# Patient Record
Sex: MALE | Race: WHITE | NOT HISPANIC OR LATINO | Employment: OTHER | URBAN - METROPOLITAN AREA
[De-identification: names, ages, dates, MRNs, and addresses within clinical notes are randomized per-mention and may not be internally consistent; named-entity substitution may affect disease eponyms.]

---

## 2017-01-13 ENCOUNTER — OFFICE VISIT (OUTPATIENT)
Dept: WOUND CARE | Facility: CLINIC | Age: 67
End: 2017-01-13
Payer: COMMERCIAL

## 2017-01-13 VITALS
WEIGHT: 315 LBS | TEMPERATURE: 99 F | HEIGHT: 72 IN | HEART RATE: 68 BPM | DIASTOLIC BLOOD PRESSURE: 71 MMHG | BODY MASS INDEX: 42.66 KG/M2 | SYSTOLIC BLOOD PRESSURE: 141 MMHG

## 2017-01-13 DIAGNOSIS — R60.0 BILATERAL LEG EDEMA: ICD-10-CM

## 2017-01-13 DIAGNOSIS — E11.8 TYPE 2 DIABETES MELLITUS WITH COMPLICATION, WITHOUT LONG-TERM CURRENT USE OF INSULIN: Primary | ICD-10-CM

## 2017-01-13 DIAGNOSIS — L97.522 SKIN ULCER OF TOE OF LEFT FOOT WITH FAT LAYER EXPOSED: ICD-10-CM

## 2017-01-13 DIAGNOSIS — L97.512 CHRONIC ULCER OF RIGHT FOOT WITH FAT LAYER EXPOSED: ICD-10-CM

## 2017-01-13 DIAGNOSIS — L97.511 ULCER OF TOE, RIGHT, LIMITED TO BREAKDOWN OF SKIN: ICD-10-CM

## 2017-01-13 DIAGNOSIS — L30.9 DERMATITIS: ICD-10-CM

## 2017-01-13 PROCEDURE — 99212 OFFICE O/P EST SF 10 MIN: CPT | Mod: S$PBB,,, | Performed by: NURSE PRACTITIONER

## 2017-01-13 PROCEDURE — 99999 PR PBB SHADOW E&M-EST. PATIENT-LVL V: CPT | Mod: PBBFAC,,, | Performed by: NURSE PRACTITIONER

## 2017-01-13 PROCEDURE — 99215 OFFICE O/P EST HI 40 MIN: CPT | Mod: PBBFAC | Performed by: NURSE PRACTITIONER

## 2017-01-13 NOTE — PATIENT INSTRUCTIONS
"Medihoney gel to bilateral foot and toe wounds daily.  Cotton in between toes.  Cover wounds with gauze and secure with roll gauze.  Compression with two 4" ace wraps bilateral lower legs.  "

## 2017-01-13 NOTE — MR AVS SNAPSHOT
[facility]  [address]  [facility] [address]  Phone: [phone]                  Jose F Hall   2017 11:00 AM   Office Visit    Description:  Male : 1950   Provider:  Nikkie Morales NP   Department:  [facility]           Reason for Visit     Wound Check           Diagnoses this Visit        Comments    Type 2 diabetes mellitus with complication, without long-term current use of insulin    -  Primary     Ulcer of toe, right, limited to breakdown of skin         Skin ulcer of toe of left foot with fat layer exposed         Chronic ulcer of right foot with fat layer exposed         Dermatitis         Bilateral leg edema                To Do List           Future Appointments        Provider Department Dept Phone    2017 2:00 PM [facility] [phone]      Goals (5 Years of Data)     None      Follow-Up and Disposition     Return in about 2 weeks (around 2017) for Wound evaluation.      [facility]     [facility] On Call Nurse Care Line -  Assistance  Registered nurses in the [facility] Center provide clinical advisement, health education, appointment booking, and other advisory services.  Call for this free service at [phone].             Medications           Message regarding Medications     Verify the changes and/or additions to your medication regime listed below are the same as discussed with your clinician today.  If any of these changes or additions are incorrect, please notify your healthcare provider.             Verify that the below list of medications is an accurate representation of the medications you are currently taking.  If none reported, the list may be blank. If incorrect, please contact your healthcare provider. Carry this list with you in case of emergency.           Current Medications     acetaminophen (TYLENOL) 325 MG tablet Take 325 mg by mouth 2 (two) times daily.     amitriptyline (ELAVIL) 75 MG tablet Take 100 mg by mouth every evening.     aspirin 81 MG Chew Take 81 mg by mouth once daily.    atenolol (TENORMIN) 100 MG tablet Take 100 mg by mouth once daily.    atorvastatin (LIPITOR) 80 MG tablet Take 40 mg by mouth every evening.     collagenase (SANTYL) ointment Apply topically once daily. Apply to wound daily as directed.    cyanocobalamin (VITAMIN B-12) 1000 MCG tablet Take 100 mcg by mouth 2 (two) times daily.     docusate sodium (COLACE) 50 MG capsule Take by mouth 2 (two) times daily.    fish oil-omega-3 fatty acids 300-1,000 mg capsule Take 2 g by mouth 3 (three) times daily.     gemfibrozil (LOPID) 600 MG tablet Take 600 mg by mouth 2 (two) times daily before meals.    insulin aspart (NOVOLOG) 100 unit/mL injection Inject 40 Units into the skin 3 (three) times daily before meals.     insulin glargine (LANTUS) 100 unit/mL injection Inject 40 Units into the skin every morning.     insulin glargine (LANTUS) 100 unit/mL injection Inject 60 Units into the skin every evening.    isosorbide dinitrate (ISORDIL) 40 MG Tab Take 20 mg by mouth once daily.     meloxicam (MOBIC) 7.5 MG tablet Take 7.5 mg by mouth 2 (two) times daily.    metformin (GLUCOPHAGE) 1000 MG tablet Take 1,000 mg by mouth 2 (two) times daily with meals.    methocarbamol (ROBAXIN) 750 MG Tab Take 750 mg by mouth 2 (two) times daily.    nitroGLYCERIN (NITROSTAT) 0.4 MG SL tablet Place 0.4 mg under the tongue every 5 (five) minutes as needed.    omeprazole (PRILOSEC) 20 MG capsule Take 20 mg by mouth 2 (two) times daily.     pregabalin (LYRICA) 150 MG capsule Take 150 mg by mouth 2 (two) times daily.    valsartan (DIOVAN) 160 MG tablet Take 160 mg by mouth once daily.    warfarin (COUMADIN) 5 MG tablet Take 10 mg by mouth every evening.           Clinical Reference Information           Vital Signs - Last Recorded  Most recent update: 1/13/2017 10:59 AM by Sarah King LPN    BP Pulse Temp Ht Wt BMI     "(!) 141/71 68 98.5 °F (36.9 °C) (Oral) 6' (1.829 m) (!) 154.7 kg (341 lb) 46.25 kg/m2      Blood Pressure          Most Recent Value    BP  (!)  141/71      Allergies as of 1/13/2017     No Known Allergies      Immunizations Administered on Date of Encounter - 1/13/2017     None      Instructions    Medihoney gel to bilateral foot and toe wounds daily.  Cotton in between toes.  Cover wounds with gauze and secure with roll gauze.  Compression with two 4" ace wraps bilateral lower legs.       "

## 2017-01-13 NOTE — PROGRESS NOTES
Subjective:       Patient ID: Jose F Hall is a 66 y.o. male.    Chief Complaint: Wound Check    Wound Check        This patient is seen today for reevaluation of wounds to both lower legs. They have been present since May 2015.  He was seen by a podiatrist at [place] and has used santyl, bacitracin and aquacel but it was not healing.  He is currently using wound medihoney gel on the heel and toe ulcers daily.  They are healing as evidenced by wound contracture. He is afebrile.  He denies increased redness, swelling or purulent drainage.  He does not complain of pain.  Review of Systems   Constitutional: Negative for chills, diaphoresis and fever.   HENT: Negative for hearing loss, postnasal drip, rhinorrhea, sinus pressure, sneezing, sore throat, tinnitus and trouble swallowing.    Eyes: Negative for visual disturbance.   Respiratory: Positive for apnea (on CPAP) and shortness of breath. Negative for cough and wheezing.    Cardiovascular: Positive for leg swelling. Negative for chest pain and palpitations.   Gastrointestinal: Negative for constipation, diarrhea, nausea and vomiting.   Endocrine: Negative for cold intolerance, heat intolerance, polydipsia and polyuria.   Genitourinary: Negative for difficulty urinating, dysuria, frequency and hematuria.   Musculoskeletal: Negative for arthralgias, back pain and joint swelling.   Skin: Positive for wound. Negative for color change.   Neurological: Negative for dizziness, weakness, light-headedness and headaches.   Hematological: Does not bruise/bleed easily.   Psychiatric/Behavioral: Negative for confusion, decreased concentration, dysphoric mood and sleep disturbance. The patient is not nervous/anxious.        Objective:      Physical Exam   Constitutional: He is oriented to person, place, and time. He appears well-developed and well-nourished. No distress.   HENT:   Head: Normocephalic and atraumatic.   Neck: Normal range of motion.  "  Pulmonary/Chest: Effort normal.   Musculoskeletal: Normal range of motion. He exhibits no edema or tenderness.        Feet:    Neurological: He is alert and oriented to person, place, and time.   Skin: Skin is warm and dry. No rash noted. He is not diaphoretic. No erythema. No pallor.   Psychiatric: He has a normal mood and affect. His behavior is normal. Judgment and thought content normal.   Nursing note and vitals reviewed.        Assessment:       1. Type 2 diabetes mellitus with complication, without long-term current use of insulin    2. Ulcer of toe, right, limited to breakdown of skin    3. Skin ulcer of toe of left foot with fat layer exposed    4. Chronic ulcer of right foot with fat layer exposed    5. Dermatitis    6. Bilateral leg edema        Plan:           Eucerin cream to lower legs twice daily.  Mepilex foam and toeball dressing left great toe.  Medihoney gel to bilateral foot and toe wounds daily.  Cotton in between toes.  Cover wounds with gauze and secure with roll gauze.  Compression with two 4" ace wraps bilateral lower legs.  Return to clinic in 2 weeks.    Right lateral foot ulcer      Left plantar great toe      Right dorsal second and third toe wounds      Left lateral fourth toe kissing ulcer                                              "

## 2017-01-27 ENCOUNTER — OFFICE VISIT (OUTPATIENT)
Dept: WOUND CARE | Facility: CLINIC | Age: 67
End: 2017-01-27
Payer: OTHER GOVERNMENT

## 2017-01-27 VITALS
HEIGHT: 72 IN | HEART RATE: 65 BPM | WEIGHT: 315 LBS | TEMPERATURE: 98 F | SYSTOLIC BLOOD PRESSURE: 125 MMHG | BODY MASS INDEX: 42.66 KG/M2 | DIASTOLIC BLOOD PRESSURE: 67 MMHG

## 2017-01-27 DIAGNOSIS — L97.511 ULCER OF TOE, RIGHT, LIMITED TO BREAKDOWN OF SKIN: ICD-10-CM

## 2017-01-27 DIAGNOSIS — R60.0 BILATERAL LEG EDEMA: ICD-10-CM

## 2017-01-27 DIAGNOSIS — I87.2 VENOUS INSUFFICIENCY OF BOTH LOWER EXTREMITIES: ICD-10-CM

## 2017-01-27 DIAGNOSIS — L97.512 CHRONIC ULCER OF RIGHT FOOT WITH FAT LAYER EXPOSED: ICD-10-CM

## 2017-01-27 DIAGNOSIS — L30.9 DERMATITIS: ICD-10-CM

## 2017-01-27 DIAGNOSIS — E11.8 TYPE 2 DIABETES MELLITUS WITH COMPLICATION, WITHOUT LONG-TERM CURRENT USE OF INSULIN: Primary | ICD-10-CM

## 2017-01-27 PROCEDURE — 99212 OFFICE O/P EST SF 10 MIN: CPT | Mod: S$PBB,,, | Performed by: NURSE PRACTITIONER

## 2017-01-27 PROCEDURE — 99215 OFFICE O/P EST HI 40 MIN: CPT | Mod: PBBFAC | Performed by: NURSE PRACTITIONER

## 2017-01-27 PROCEDURE — 99999 PR PBB SHADOW E&M-EST. PATIENT-LVL V: CPT | Mod: PBBFAC,,, | Performed by: NURSE PRACTITIONER

## 2017-01-27 NOTE — MR AVS SNAPSHOT
[facility]  [address]  [facility] [address]  Phone: [phone]                  Jose F Hall   2017 2:00 PM   Office Visit    Description:  Male : 1950   Provider:  Nikkie Morales NP   Department:  [facility]           Reason for Visit     Wound Check           Diagnoses this Visit        Comments    Type 2 diabetes mellitus with complication, without long-term current use of insulin    -  Primary     Chronic ulcer of right foot with fat layer exposed         Ulcer of toe, right, limited to breakdown of skin         Venous insufficiency of both lower extremities         Bilateral leg edema         Dermatitis                To Do List           Future Appointments        Provider Department Dept Phone    2/10/2017 11:20 AM [facility] [phone]      Goals (5 Years of Data)     None      Follow-Up and Disposition     Return in about 2 weeks (around 2/10/2017) for Wound evaluation.      [facility]     [facility] On Call Nurse Care Line -  Assistance  Registered nurses in the [facility] Call Center provide clinical advisement, health education, appointment booking, and other advisory services.  Call for this free service at [phone].             Medications           Message regarding Medications     Verify the changes and/or additions to your medication regime listed below are the same as discussed with your clinician today.  If any of these changes or additions are incorrect, please notify your healthcare provider.             Verify that the below list of medications is an accurate representation of the medications you are currently taking.  If none reported, the list may be blank. If incorrect, please contact your healthcare provider. Carry this list with you in case of emergency.           Current Medications     acetaminophen (TYLENOL) 325 MG tablet Take 325 mg by mouth 2 (two) times daily.     amitriptyline (ELAVIL) 75 MG tablet Take 100 mg by mouth every evening.     aspirin 81 MG Chew Take 81 mg by mouth once daily.    atenolol (TENORMIN) 100 MG tablet Take 100 mg by mouth once daily.    atorvastatin (LIPITOR) 80 MG tablet Take 40 mg by mouth every evening.     collagenase (SANTYL) ointment Apply topically once daily. Apply to wound daily as directed.    cyanocobalamin (VITAMIN B-12) 1000 MCG tablet Take 100 mcg by mouth 2 (two) times daily.     docusate sodium (COLACE) 50 MG capsule Take by mouth 2 (two) times daily.    fish oil-omega-3 fatty acids 300-1,000 mg capsule Take 2 g by mouth 3 (three) times daily.     gemfibrozil (LOPID) 600 MG tablet Take 600 mg by mouth 2 (two) times daily before meals.    insulin aspart (NOVOLOG) 100 unit/mL injection Inject 40 Units into the skin 3 (three) times daily before meals.     insulin glargine (LANTUS) 100 unit/mL injection Inject 40 Units into the skin every morning.     insulin glargine (LANTUS) 100 unit/mL injection Inject 60 Units into the skin every evening.    isosorbide dinitrate (ISORDIL) 40 MG Tab Take 20 mg by mouth once daily.     meloxicam (MOBIC) 7.5 MG tablet Take 7.5 mg by mouth 2 (two) times daily.    metformin (GLUCOPHAGE) 1000 MG tablet Take 1,000 mg by mouth 2 (two) times daily with meals.    methocarbamol (ROBAXIN) 750 MG Tab Take 750 mg by mouth 2 (two) times daily.    nitroGLYCERIN (NITROSTAT) 0.4 MG SL tablet Place 0.4 mg under the tongue every 5 (five) minutes as needed.    omeprazole (PRILOSEC) 20 MG capsule Take 20 mg by mouth 2 (two) times daily.     pregabalin (LYRICA) 150 MG capsule Take 150 mg by mouth 2 (two) times daily.    valsartan (DIOVAN) 160 MG tablet Take 160 mg by mouth once daily.    warfarin (COUMADIN) 5 MG tablet Take 10 mg by mouth every evening.           Clinical Reference Information           Vital Signs - Last Recorded  Most recent update: 1/27/2017  1:58 PM by Sarah King LPN    BP Pulse Temp Ht Wt BMI     "125/67 65 98.2 °F (36.8 °C) (Oral) 6' (1.829 m) (!) 153.3 kg (338 lb) 45.84 kg/m2      Blood Pressure          Most Recent Value    BP  125/67      Allergies as of 1/27/2017     No Known Allergies      Immunizations Administered on Date of Encounter - 1/27/2017     None      Instructions    Eucerin cream to lower legs twice daily.  Medihoney gel to bilateral toe wounds daily.  Cotton in between toes.  Cover wounds with gauze and secure with roll gauze.  Compression with two 4" ace wraps bilateral lower legs.  May resume wearing compression hose as long as the toes are not covered.       "

## 2017-01-27 NOTE — PATIENT INSTRUCTIONS
"Eucerin cream to lower legs twice daily.  Medihoney gel to bilateral toe wounds daily.  Cotton in between toes.  Cover wounds with gauze and secure with roll gauze.  Compression with two 4" ace wraps bilateral lower legs.  May resume wearing compression hose as long as the toes are not covered.  "

## 2017-01-27 NOTE — PROGRESS NOTES
Subjective:       Patient ID: Jose F Hall is a 66 y.o. male.    Chief Complaint: Wound Check    Wound Check        This patient is seen today for reevaluation of wounds to both lower legs. They have been present since May 2015.  He was seen by a podiatrist at [place] and has used santyl, bacitracin and aquacel but it was not healing.  He is currently using wound medihoney gel on the heel and toe ulcers daily.  They are healing as evidenced by wound contracture. He is afebrile.  He denies increased redness, swelling or purulent drainage.  He does not complain of pain.  Review of Systems   Constitutional: Negative for chills, diaphoresis and fever.   HENT: Negative for hearing loss, postnasal drip, rhinorrhea, sinus pressure, sneezing, sore throat, tinnitus and trouble swallowing.    Eyes: Negative for visual disturbance.   Respiratory: Positive for apnea (on CPAP) and shortness of breath. Negative for cough and wheezing.    Cardiovascular: Positive for leg swelling. Negative for chest pain and palpitations.   Gastrointestinal: Negative for constipation, diarrhea, nausea and vomiting.   Endocrine: Negative for cold intolerance, heat intolerance, polydipsia and polyuria.   Genitourinary: Negative for difficulty urinating, dysuria, frequency and hematuria.   Musculoskeletal: Negative for arthralgias, back pain and joint swelling.   Skin: Positive for wound. Negative for color change.   Neurological: Negative for dizziness, weakness, light-headedness and headaches.   Hematological: Does not bruise/bleed easily.   Psychiatric/Behavioral: Negative for confusion, decreased concentration, dysphoric mood and sleep disturbance. The patient is not nervous/anxious.        Objective:      Physical Exam   Constitutional: He is oriented to person, place, and time. He appears well-developed and well-nourished. No distress.   HENT:   Head: Normocephalic and atraumatic.   Neck: Normal range of motion.  "  Pulmonary/Chest: Effort normal.   Musculoskeletal: Normal range of motion. He exhibits no edema or tenderness.        Feet:    Neurological: He is alert and oriented to person, place, and time.   Skin: Skin is warm and dry. No rash noted. He is not diaphoretic. No erythema. No pallor.   Psychiatric: He has a normal mood and affect. His behavior is normal. Judgment and thought content normal.   Nursing note and vitals reviewed.        Assessment:       1. Type 2 diabetes mellitus with complication, without long-term current use of insulin    2. Chronic ulcer of right foot with fat layer exposed    3. Ulcer of toe, right, limited to breakdown of skin    4. Venous insufficiency of both lower extremities    5. Bilateral leg edema    6. Dermatitis        Plan:           Eucerin cream to lower legs twice daily.  Medihoney gel to bilateral toe wounds daily.  Cotton in between toes.  Cover wounds with gauze and secure with roll gauze.  Compression with two 4" ace wraps bilateral lower legs.  May resume wearing compression hose.  Return to clinic in 2 weeks.    Right lateral foot ulcer healed      Left plantar great toe      Right dorsal second and third toe wounds      Left lateral fourth toe kissing ulcer                                                  "

## 2017-02-24 ENCOUNTER — OFFICE VISIT (OUTPATIENT)
Dept: WOUND CARE | Facility: CLINIC | Age: 67
End: 2017-02-24
Payer: COMMERCIAL

## 2017-02-24 VITALS
DIASTOLIC BLOOD PRESSURE: 74 MMHG | HEIGHT: 72 IN | TEMPERATURE: 98 F | SYSTOLIC BLOOD PRESSURE: 134 MMHG | HEART RATE: 66 BPM | WEIGHT: 315 LBS | BODY MASS INDEX: 42.66 KG/M2

## 2017-02-24 DIAGNOSIS — E11.8 TYPE 2 DIABETES MELLITUS WITH COMPLICATION, WITHOUT LONG-TERM CURRENT USE OF INSULIN: Primary | ICD-10-CM

## 2017-02-24 DIAGNOSIS — L97.522 SKIN ULCER OF TOE OF LEFT FOOT WITH FAT LAYER EXPOSED: ICD-10-CM

## 2017-02-24 DIAGNOSIS — L97.511 ULCER OF TOE, RIGHT, LIMITED TO BREAKDOWN OF SKIN: ICD-10-CM

## 2017-02-24 DIAGNOSIS — R60.0 BILATERAL LEG EDEMA: ICD-10-CM

## 2017-02-24 DIAGNOSIS — L30.9 DERMATITIS: ICD-10-CM

## 2017-02-24 PROCEDURE — 99999 PR PBB SHADOW E&M-EST. PATIENT-LVL V: CPT | Mod: PBBFAC,,, | Performed by: NURSE PRACTITIONER

## 2017-02-24 PROCEDURE — 99215 OFFICE O/P EST HI 40 MIN: CPT | Mod: PBBFAC | Performed by: NURSE PRACTITIONER

## 2017-02-24 PROCEDURE — 99212 OFFICE O/P EST SF 10 MIN: CPT | Mod: S$PBB,,, | Performed by: NURSE PRACTITIONER

## 2017-02-24 NOTE — PROGRESS NOTES
Subjective:       Patient ID: Jose F Hall is a 67 y.o. male.    Chief Complaint: Wound Check    Wound Check        This patient is seen today for reevaluation of wounds to both lower legs. They have been present since May 2015.  He was seen by a podiatrist at [place] and has used santyl, bacitracin and aquacel but it was not healing.  He is currently using wound medihoney gel on the toe ulcers daily.  The left toe ulcers are healed and the right toe ulcers are healing as evidenced by wound contracture. He is afebrile.  He denies increased redness, swelling or purulent drainage.  He does not complain of pain.  Review of Systems   Constitutional: Negative for chills, diaphoresis and fever.   HENT: Negative for hearing loss, postnasal drip, rhinorrhea, sinus pressure, sneezing, sore throat, tinnitus and trouble swallowing.    Eyes: Negative for visual disturbance.   Respiratory: Positive for apnea (on CPAP) and shortness of breath. Negative for cough and wheezing.    Cardiovascular: Positive for leg swelling. Negative for chest pain and palpitations.   Gastrointestinal: Negative for constipation, diarrhea, nausea and vomiting.   Endocrine: Negative for cold intolerance, heat intolerance, polydipsia and polyuria.   Genitourinary: Negative for difficulty urinating, dysuria, frequency and hematuria.   Musculoskeletal: Negative for arthralgias, back pain and joint swelling.   Skin: Positive for wound. Negative for color change.   Neurological: Negative for dizziness, weakness, light-headedness and headaches.   Hematological: Does not bruise/bleed easily.   Psychiatric/Behavioral: Negative for confusion, decreased concentration, dysphoric mood and sleep disturbance. The patient is not nervous/anxious.        Objective:      Physical Exam   Constitutional: He is oriented to person, place, and time. He appears well-developed and well-nourished. No distress.   HENT:   Head: Normocephalic and atraumatic.    Neck: Normal range of motion.   Pulmonary/Chest: Effort normal.   Musculoskeletal: Normal range of motion. He exhibits no edema or tenderness.        Feet:    Neurological: He is alert and oriented to person, place, and time.   Skin: Skin is warm and dry. No rash noted. He is not diaphoretic. No erythema. No pallor.   Psychiatric: He has a normal mood and affect. His behavior is normal. Judgment and thought content normal.   Nursing note and vitals reviewed.        Assessment:       1. Type 2 diabetes mellitus with complication, without long-term current use of insulin    2. Ulcer of toe, right, limited to breakdown of skin    3. Chronic ulcer of right foot with fat layer exposed    4. Skin ulcer of toe of left foot with fat layer exposed    5. Dermatitis    6. Bilateral leg edema        Plan:           Eucerin cream to lower legs twice daily.  Medihoney gel to right toe wounds daily.  Cotton in between toes.  Cover wounds with gauze and secure with roll gauze.  Compression hose bilateral lower legs.  Return to clinic in 2 weeks.    Left plantar great toe healed      Right dorsal second and third toe wounds      Left lateral fourth toe kissing ulcer healed

## 2017-02-24 NOTE — MR AVS SNAPSHOT
[facility]  [address]  [facility] [address]  Phone: [phone]                  Jose F Hall   2017 2:00 PM   Office Visit    Description:  Male : 1950   Provider:  Nikkie Morales NP   Department:  [facility]           Reason for Visit     Wound Check           Diagnoses this Visit        Comments    Type 2 diabetes mellitus with complication, without long-term current use of insulin    -  Primary     Ulcer of toe, right, limited to breakdown of skin         Skin ulcer of toe of left foot with fat layer exposed         Dermatitis         Bilateral leg edema                To Do List           Goals (5 Years of Data)     None      Follow-Up and Disposition     Return in about 2 weeks (around 3/10/2017) for Wound evaluation.      [facility] On Call     [facility] On Call Nurse Care Line -  Assistance  Registered nurses in the [facility] On Call Center provide clinical advisement, health education, appointment booking, and other advisory services.  Call for this free service at [phone].             Medications           Message regarding Medications     Verify the changes and/or additions to your medication regime listed below are the same as discussed with your clinician today.  If any of these changes or additions are incorrect, please notify your healthcare provider.             Verify that the below list of medications is an accurate representation of the medications you are currently taking.  If none reported, the list may be blank. If incorrect, please contact your healthcare provider. Carry this list with you in case of emergency.           Current Medications     acetaminophen (TYLENOL) 325 MG tablet Take 325 mg by mouth 2 (two) times daily.    amitriptyline (ELAVIL) 75 MG tablet Take 100 mg by mouth every evening.     aspirin 81 MG Chew Take 81 mg by mouth once daily.    atenolol (TENORMIN) 100 MG tablet Take 100 mg by mouth once daily.     atorvastatin (LIPITOR) 80 MG tablet Take 40 mg by mouth every evening.     collagenase (SANTYL) ointment Apply topically once daily. Apply to wound daily as directed.    cyanocobalamin (VITAMIN B-12) 1000 MCG tablet Take 100 mcg by mouth 2 (two) times daily.     docusate sodium (COLACE) 50 MG capsule Take by mouth 2 (two) times daily.    fish oil-omega-3 fatty acids 300-1,000 mg capsule Take 2 g by mouth 3 (three) times daily.     gemfibrozil (LOPID) 600 MG tablet Take 600 mg by mouth 2 (two) times daily before meals.    insulin aspart (NOVOLOG) 100 unit/mL injection Inject 40 Units into the skin 3 (three) times daily before meals.     insulin glargine (LANTUS) 100 unit/mL injection Inject 40 Units into the skin every morning.     insulin glargine (LANTUS) 100 unit/mL injection Inject 60 Units into the skin every evening.    isosorbide dinitrate (ISORDIL) 40 MG Tab Take 20 mg by mouth once daily.     meloxicam (MOBIC) 7.5 MG tablet Take 7.5 mg by mouth 2 (two) times daily.    metformin (GLUCOPHAGE) 1000 MG tablet Take 1,000 mg by mouth 2 (two) times daily with meals.    methocarbamol (ROBAXIN) 750 MG Tab Take 750 mg by mouth 2 (two) times daily.    nitroGLYCERIN (NITROSTAT) 0.4 MG SL tablet Place 0.4 mg under the tongue every 5 (five) minutes as needed.    omeprazole (PRILOSEC) 20 MG capsule Take 20 mg by mouth 2 (two) times daily.     pregabalin (LYRICA) 150 MG capsule Take 150 mg by mouth 2 (two) times daily.    valsartan (DIOVAN) 160 MG tablet Take 160 mg by mouth once daily.    warfarin (COUMADIN) 5 MG tablet Take 10 mg by mouth every evening.           Clinical Reference Information           Your Vitals Were     BP                   134/74           Blood Pressure          Most Recent Value    BP  134/74      Allergies as of 2/24/2017     No Known Allergies      Immunizations Administered on Date of Encounter - 2/24/2017     None      Instructions    Eucerin cream to lower legs twice daily.  Medihoney gel to  right toe wounds daily.  Cotton in between toes.  Cover wounds with gauze and secure with roll gauze.  Compression hose bilateral lower legs.       Language Assistance Services     ATTENTION: Language assistance services are available, free of charge. Please call [phone].      ATENCIÓN: Si habla pricilla, tiene a fields disposición servicios gratuitos de asistencia lingüística. Llame al [phone].     CHÚ Ý: N?u b?n nói Ti?ng Vi?t, có các d?ch v? h? tr? ngôn ng? mi?n phí dành cho b?n. G?i s? [phone].         [facility] complies with applicable Federal civil rights laws and does not discriminate on the basis of race, color, national origin, age, disability, or sex.

## 2017-02-24 NOTE — PATIENT INSTRUCTIONS
Eucerin cream to lower legs twice daily.  Medihoney gel to right toe wounds daily.  Cotton in between toes.  Cover wounds with gauze and secure with roll gauze.  Compression hose bilateral lower legs.

## 2017-03-10 ENCOUNTER — OFFICE VISIT (OUTPATIENT)
Dept: WOUND CARE | Facility: CLINIC | Age: 67
End: 2017-03-10
Payer: COMMERCIAL

## 2017-03-10 VITALS
BODY MASS INDEX: 40.43 KG/M2 | HEART RATE: 69 BPM | HEIGHT: 74 IN | TEMPERATURE: 98 F | SYSTOLIC BLOOD PRESSURE: 145 MMHG | DIASTOLIC BLOOD PRESSURE: 73 MMHG | WEIGHT: 315 LBS

## 2017-03-10 DIAGNOSIS — E11.8 TYPE 2 DIABETES MELLITUS WITH COMPLICATION, WITHOUT LONG-TERM CURRENT USE OF INSULIN: Primary | ICD-10-CM

## 2017-03-10 DIAGNOSIS — L97.522 SKIN ULCER OF TOE OF LEFT FOOT WITH FAT LAYER EXPOSED: ICD-10-CM

## 2017-03-10 DIAGNOSIS — R60.0 BILATERAL LEG EDEMA: ICD-10-CM

## 2017-03-10 DIAGNOSIS — L30.9 DERMATITIS: ICD-10-CM

## 2017-03-10 PROCEDURE — 99999 PR PBB SHADOW E&M-EST. PATIENT-LVL V: CPT | Mod: PBBFAC,,, | Performed by: NURSE PRACTITIONER

## 2017-03-10 PROCEDURE — 99215 OFFICE O/P EST HI 40 MIN: CPT | Mod: PBBFAC | Performed by: NURSE PRACTITIONER

## 2017-03-10 PROCEDURE — 99212 OFFICE O/P EST SF 10 MIN: CPT | Mod: S$PBB,,, | Performed by: NURSE PRACTITIONER

## 2017-03-10 NOTE — PATIENT INSTRUCTIONS
"Eucerin cream to lower legs twice daily.  Medihoney gel to foot and toe wounds daily.  Cotton in between toes.  Cover wounds with gauze and secure with roll gauze.  Compression hose left lower leg.  Kerlix and two 4" ace wraps right lower leg.    "

## 2017-03-10 NOTE — MR AVS SNAPSHOT
[facility]  [address]  [facility] [address]  Phone: [phone]                  Jose F Hall   3/10/2017 1:00 PM   Office Visit    Description:  Male : 1950   Provider:  Nikkie Morales NP   Department:  [facility]           Reason for Visit     Wound Check           Diagnoses this Visit        Comments    Type 2 diabetes mellitus with complication, without long-term current use of insulin    -  Primary     Skin ulcer of toe of left foot with fat layer exposed         Dermatitis         Bilateral leg edema                To Do List           Goals (5 Years of Data)     None      Follow-Up and Disposition     Return in about 2 weeks (around 3/24/2017) for Wound evaluation.      [facility]     [facility] On Call Nurse [facility] Line -  Assistance  Registered nurses in the [facility] On Call Center provide clinical advisement, health education, appointment booking, and other advisory services.  Call for this free service at [phone].             Medications           Message regarding Medications     Verify the changes and/or additions to your medication regime listed below are the same as discussed with your clinician today.  If any of these changes or additions are incorrect, please notify your healthcare provider.             Verify that the below list of medications is an accurate representation of the medications you are currently taking.  If none reported, the list may be blank. If incorrect, please contact your healthcare provider. Carry this list with you in case of emergency.           Current Medications     acetaminophen (TYLENOL) 325 MG tablet Take 325 mg by mouth 2 (two) times daily.    amitriptyline (ELAVIL) 75 MG tablet Take 100 mg by mouth every evening.     aspirin 81 MG Chew Take 81 mg by mouth once daily.    atenolol (TENORMIN) 100 MG tablet Take 100 mg by mouth once daily.    atorvastatin (LIPITOR) 80 MG tablet Take 40 mg by mouth  "every evening.     collagenase (SANTYL) ointment Apply topically once daily. Apply to wound daily as directed.    cyanocobalamin (VITAMIN B-12) 1000 MCG tablet Take 100 mcg by mouth 2 (two) times daily.     docusate sodium (COLACE) 50 MG capsule Take by mouth 2 (two) times daily.    fish oil-omega-3 fatty acids 300-1,000 mg capsule Take 2 g by mouth 3 (three) times daily.     gemfibrozil (LOPID) 600 MG tablet Take 600 mg by mouth 2 (two) times daily before meals.    insulin aspart (NOVOLOG) 100 unit/mL injection Inject 40 Units into the skin 3 (three) times daily before meals.     insulin glargine (LANTUS) 100 unit/mL injection Inject 40 Units into the skin every morning.     insulin glargine (LANTUS) 100 unit/mL injection Inject 60 Units into the skin every evening.    isosorbide dinitrate (ISORDIL) 40 MG Tab Take 20 mg by mouth once daily.     meloxicam (MOBIC) 7.5 MG tablet Take 7.5 mg by mouth 2 (two) times daily.    metformin (GLUCOPHAGE) 1000 MG tablet Take 1,000 mg by mouth 2 (two) times daily with meals.    methocarbamol (ROBAXIN) 750 MG Tab Take 750 mg by mouth 2 (two) times daily.    nitroGLYCERIN (NITROSTAT) 0.4 MG SL tablet Place 0.4 mg under the tongue every 5 (five) minutes as needed.    omeprazole (PRILOSEC) 20 MG capsule Take 20 mg by mouth 2 (two) times daily.     pregabalin (LYRICA) 150 MG capsule Take 150 mg by mouth 2 (two) times daily.    valsartan (DIOVAN) 160 MG tablet Take 160 mg by mouth once daily.    warfarin (COUMADIN) 5 MG tablet Take 10 mg by mouth every evening.           Clinical Reference Information           Allergies as of 3/10/2017     No Known Allergies      Immunizations Administered on Date of Encounter - 3/10/2017     None      Instructions    Eucerin cream to lower legs twice daily.  Medihoney gel to foot and toe wounds daily.  Cotton in between toes.  Cover wounds with gauze and secure with roll gauze.  Compression hose left lower leg.  Kerlix and two 4" ace wraps right " lower leg.         Language Assistance Services     ATTENTION: Language assistance services are available, free of charge. Please call [phone].      ATENCIÓN: Si habla pricilla, tiene a fields disposición servicios gratuitos de asistencia lingüística. Llame al [phone].     CHÚ Ý: N?u b?n nói Ti?ng Vi?t, có các d?ch v? h? tr? ngôn ng? mi?n phí dành cho b?n. G?i s? [phone].         [facility] complies with applicable Federal civil rights laws and does not discriminate on the basis of race, color, national origin, age, disability, or sex.

## 2017-03-10 NOTE — PROGRESS NOTES
Subjective:       Patient ID: Jose F Hall is a 67 y.o. male.    Chief Complaint: Wound Check    Wound Check        This patient is seen today for reevaluation of wounds to both lower legs. They have been present since May 2015.  He was seen by a podiatrist at [place] and has used santyl, bacitracin and aquacel but it was not healing.  He is currently using wound medihoney gel on the ulcers daily.  The left toe ulcer has recurred.  The right toe ulcers are healing as evidenced by wound contracture. However, he has a new ulcer to the plantar aspect of the right foot.  He believes this occurred because of a special shoe he was given to wear by a physician at the [facility].  He is afebrile.  He denies increased redness, swelling or purulent drainage.  He does not complain of pain.  Review of Systems   Constitutional: Negative for chills, diaphoresis and fever.   HENT: Negative for hearing loss, postnasal drip, rhinorrhea, sinus pressure, sneezing, sore throat, tinnitus and trouble swallowing.    Eyes: Negative for visual disturbance.   Respiratory: Positive for apnea (on CPAP) and shortness of breath. Negative for cough and wheezing.    Cardiovascular: Positive for leg swelling. Negative for chest pain and palpitations.   Gastrointestinal: Negative for constipation, diarrhea, nausea and vomiting.   Endocrine: Negative for cold intolerance, heat intolerance, polydipsia and polyuria.   Genitourinary: Negative for difficulty urinating, dysuria, frequency and hematuria.   Musculoskeletal: Negative for arthralgias, back pain and joint swelling.   Skin: Positive for wound. Negative for color change.   Neurological: Negative for dizziness, weakness, light-headedness and headaches.   Hematological: Does not bruise/bleed easily.   Psychiatric/Behavioral: Negative for confusion, decreased concentration, dysphoric mood and sleep disturbance. The patient is not nervous/anxious.        Objective:      Physical  "Exam   Constitutional: He is oriented to person, place, and time. He appears well-developed and well-nourished. No distress.   HENT:   Head: Normocephalic and atraumatic.   Neck: Normal range of motion.   Pulmonary/Chest: Effort normal.   Musculoskeletal: Normal range of motion. He exhibits no edema or tenderness.        Feet:    Neurological: He is alert and oriented to person, place, and time.   Skin: Skin is warm and dry. No rash noted. He is not diaphoretic. No erythema. No pallor.   Psychiatric: He has a normal mood and affect. His behavior is normal. Judgment and thought content normal.   Nursing note and vitals reviewed.        Assessment:       1. Type 2 diabetes mellitus with complication, without long-term current use of insulin    2. Skin ulcer of toe of left foot with fat layer exposed    3. Dermatitis    4. Bilateral leg edema        Plan:           Eucerin cream to lower legs twice daily.  Medihoney gel to foot and toe wounds daily.  Cotton in between toes.  Cover wounds with gauze and secure with roll gauze.  Compression hose left lower leg.  Kerlix and two 4" ace wraps right lower leg.  Return to clinic in 2 weeks.    Right dorsal second and third toe wounds      Left great toe      Right plantar foot                                                                "

## 2017-03-24 ENCOUNTER — OFFICE VISIT (OUTPATIENT)
Dept: WOUND CARE | Facility: CLINIC | Age: 67
End: 2017-03-24
Payer: OTHER GOVERNMENT

## 2017-03-24 VITALS
DIASTOLIC BLOOD PRESSURE: 68 MMHG | SYSTOLIC BLOOD PRESSURE: 117 MMHG | TEMPERATURE: 98 F | HEIGHT: 74 IN | HEART RATE: 69 BPM | BODY MASS INDEX: 40.43 KG/M2 | WEIGHT: 315 LBS

## 2017-03-24 DIAGNOSIS — L97.511 ULCER OF TOE, RIGHT, LIMITED TO BREAKDOWN OF SKIN: ICD-10-CM

## 2017-03-24 DIAGNOSIS — L97.522 SKIN ULCER OF TOE OF LEFT FOOT WITH FAT LAYER EXPOSED: ICD-10-CM

## 2017-03-24 DIAGNOSIS — R60.0 BILATERAL LEG EDEMA: ICD-10-CM

## 2017-03-24 DIAGNOSIS — L97.509 ULCER OF OTHER PART OF FOOT: ICD-10-CM

## 2017-03-24 DIAGNOSIS — L30.9 DERMATITIS: ICD-10-CM

## 2017-03-24 DIAGNOSIS — E11.8 TYPE 2 DIABETES MELLITUS WITH COMPLICATION, WITHOUT LONG-TERM CURRENT USE OF INSULIN: Primary | ICD-10-CM

## 2017-03-24 PROCEDURE — 99999 PR PBB SHADOW E&M-EST. PATIENT-LVL V: CPT | Mod: PBBFAC,,, | Performed by: NURSE PRACTITIONER

## 2017-03-24 PROCEDURE — 99212 OFFICE O/P EST SF 10 MIN: CPT | Mod: S$PBB,,, | Performed by: NURSE PRACTITIONER

## 2017-03-24 PROCEDURE — 99215 OFFICE O/P EST HI 40 MIN: CPT | Mod: PBBFAC | Performed by: NURSE PRACTITIONER

## 2017-03-24 NOTE — MR AVS SNAPSHOT
[facility]  [address]  [facility] [address]  Phone: [phone]                  Jose F Hall   3/24/2017 11:00 AM   Office Visit    Description:  Male : 1950   Provider:  Nikkie Morales NP   Department:  [facility]           Reason for Visit     Wound Check           Diagnoses this Visit        Comments    Type 2 diabetes mellitus with complication, without long-term current use of insulin    -  Primary     Ulcer of other part of foot         Skin ulcer of toe of left foot with fat layer exposed         Ulcer of toe, right, limited to breakdown of skin         Dermatitis         Bilateral leg edema                To Do List           Future Appointments        Provider Department Dept Phone    3/24/2017 11:00 AM [facility] [phone]      Goals (5 Years of Data)     None      Follow-Up and Disposition     Return in about 3 weeks (around 2017) for Wound evaluation.      [facility] On Call     [facility] On Call Nurse [facility] Line -  Assistance  Registered nurses in the [facility] On Call Center provide clinical advisement, health education, appointment booking, and other advisory services.  Call for this free service at [phone].             Medications           Message regarding Medications     Verify the changes and/or additions to your medication regime listed below are the same as discussed with your clinician today.  If any of these changes or additions are incorrect, please notify your healthcare provider.             Verify that the below list of medications is an accurate representation of the medications you are currently taking.  If none reported, the list may be blank. If incorrect, please contact your healthcare provider. Carry this list with you in case of emergency.           Current Medications     acetaminophen (TYLENOL) 325 MG tablet Take 325 mg by mouth 2 (two) times daily.    amitriptyline (ELAVIL) 75  MG tablet Take 100 mg by mouth every evening.     aspirin 81 MG Chew Take 81 mg by mouth once daily.    atenolol (TENORMIN) 100 MG tablet Take 100 mg by mouth once daily.    atorvastatin (LIPITOR) 80 MG tablet Take 40 mg by mouth every evening.     collagenase (SANTYL) ointment Apply topically once daily. Apply to wound daily as directed.    cyanocobalamin (VITAMIN B-12) 1000 MCG tablet Take 100 mcg by mouth 2 (two) times daily.     docusate sodium (COLACE) 50 MG capsule Take by mouth 2 (two) times daily.    fish oil-omega-3 fatty acids 300-1,000 mg capsule Take 2 g by mouth 3 (three) times daily.     gemfibrozil (LOPID) 600 MG tablet Take 600 mg by mouth 2 (two) times daily before meals.    insulin aspart (NOVOLOG) 100 unit/mL injection Inject 40 Units into the skin 3 (three) times daily before meals.     insulin glargine (LANTUS) 100 unit/mL injection Inject 40 Units into the skin every morning.     insulin glargine (LANTUS) 100 unit/mL injection Inject 60 Units into the skin every evening.    isosorbide dinitrate (ISORDIL) 40 MG Tab Take 20 mg by mouth once daily.     meloxicam (MOBIC) 7.5 MG tablet Take 7.5 mg by mouth 2 (two) times daily.    metformin (GLUCOPHAGE) 1000 MG tablet Take 1,000 mg by mouth 2 (two) times daily with meals.    methocarbamol (ROBAXIN) 750 MG Tab Take 750 mg by mouth 2 (two) times daily.    nitroGLYCERIN (NITROSTAT) 0.4 MG SL tablet Place 0.4 mg under the tongue every 5 (five) minutes as needed.    omeprazole (PRILOSEC) 20 MG capsule Take 20 mg by mouth 2 (two) times daily.     pregabalin (LYRICA) 150 MG capsule Take 150 mg by mouth 2 (two) times daily.    valsartan (DIOVAN) 160 MG tablet Take 160 mg by mouth once daily.    warfarin (COUMADIN) 5 MG tablet Take 10 mg by mouth every evening.           Clinical Reference Information           Your Vitals Were     BP Pulse Temp Height Weight BMI    117/68 (BP Location: Right arm, Patient Position: Sitting, BP Method: Automatic) 69 98.4 °F  "(36.9 °C) (Oral) 6' 2" (1.88 m) 155.1 kg (341 lb 14.4 oz) 43.9 kg/m2      Blood Pressure          Most Recent Value    BP  117/68      Allergies as of 3/24/2017     No Known Allergies      Immunizations Administered on Date of Encounter - 3/24/2017     None      Instructions    Eucerin cream to lower legs twice daily.  Medihoney gel to foot and toe wounds daily.  Cotton in between toes.  Cover wounds with gauze and secure with roll gauze.  Kerlix and two 4" ace wraps bilateral lower legs.       Language Assistance Services     ATTENTION: Language assistance services are available, free of charge. Please call [phone].      ATENCIÓN: Si jaylynla pricilla, tiene a fields disposición servicios gratuitos de asistencia lingüística. Llame al [phone].     SENDY Ý: N?u b?n nói Ti?ng Vi?t, có các d?ch v? h? tr? ngôn ng? mi?n phí dành cho b?n. G?i s? [phone].         [facility] complies with applicable Federal civil rights laws and does not discriminate on the basis of race, color, national origin, age, disability, or sex.        "

## 2017-03-24 NOTE — PATIENT INSTRUCTIONS
"Eucerin cream to lower legs twice daily.  Medihoney gel to foot and toe wounds daily.  Cotton in between toes.  Cover wounds with gauze and secure with roll gauze.  Kerlix and two 4" ace wraps bilateral lower legs.  "

## 2017-03-24 NOTE — PROGRESS NOTES
Subjective:       Patient ID: Jose F Hall is a 67 y.o. male.    Chief Complaint: Wound Check    Wound Check        This patient is seen today for reevaluation of wounds to both lower legs. They have been present since May 2015.  He was seen by a podiatrist at [place] and has used santyl, bacitracin and aquacel but it was not healing.  He is currently using wound medihoney gel on the ulcers daily.  He was given new footwear by the [facility] for the right leg.  The new shoe has caused pressure and reopened the wound on the right lateral heel which was closed.  The right plantar foot ulcer is smaller but the toe ulcers are larger.  He is afebrile.  He denies increased redness, swelling or purulent drainage.  His pain level is 1/10.  Review of Systems   Constitutional: Negative for chills, diaphoresis and fever.   HENT: Negative for hearing loss, postnasal drip, rhinorrhea, sinus pressure, sneezing, sore throat, tinnitus and trouble swallowing.    Eyes: Negative for visual disturbance.   Respiratory: Positive for apnea (on CPAP) and shortness of breath. Negative for cough and wheezing.    Cardiovascular: Positive for leg swelling. Negative for chest pain and palpitations.   Gastrointestinal: Negative for constipation, diarrhea, nausea and vomiting.   Endocrine: Negative for cold intolerance, heat intolerance, polydipsia and polyuria.   Genitourinary: Negative for difficulty urinating, dysuria, frequency and hematuria.   Musculoskeletal: Negative for arthralgias, back pain and joint swelling.   Skin: Positive for wound. Negative for color change.   Neurological: Negative for dizziness, weakness, light-headedness and headaches.   Hematological: Does not bruise/bleed easily.   Psychiatric/Behavioral: Negative for confusion, decreased concentration, dysphoric mood and sleep disturbance. The patient is not nervous/anxious.        Objective:      Physical Exam   Constitutional: He is oriented to person,  "place, and time. He appears well-developed and well-nourished. No distress.   HENT:   Head: Normocephalic and atraumatic.   Neck: Normal range of motion.   Pulmonary/Chest: Effort normal.   Musculoskeletal: Normal range of motion. He exhibits no edema or tenderness.        Feet:    Neurological: He is alert and oriented to person, place, and time.   Skin: Skin is warm and dry. No rash noted. He is not diaphoretic. No erythema. No pallor.   Psychiatric: He has a normal mood and affect. His behavior is normal. Judgment and thought content normal.   Nursing note and vitals reviewed.        Assessment:       1. Type 2 diabetes mellitus with complication, without long-term current use of insulin    2. Skin ulcer of toe of left foot with fat layer exposed    3. Ulcer of toe, right, limited to breakdown of skin    4. Dermatitis    5. Bilateral leg edema        Plan:           Eucerin cream to lower legs twice daily.  Medihoney gel to foot and toe wounds daily.  Cotton in between toes.  Cover wounds with gauze and secure with roll gauze.  Kerlix and two 4" ace wraps bilateral lower legs.  Return to clinic in 2 weeks.    Right dorsal second and third toe wounds      Left great toe      Right plantar foot      Right lateral heel                                                                    "

## 2017-04-13 ENCOUNTER — OFFICE VISIT (OUTPATIENT)
Dept: WOUND CARE | Facility: CLINIC | Age: 67
End: 2017-04-13
Payer: OTHER GOVERNMENT

## 2017-04-13 VITALS
HEART RATE: 71 BPM | HEIGHT: 74 IN | RESPIRATION RATE: 18 BRPM | BODY MASS INDEX: 40.43 KG/M2 | WEIGHT: 315 LBS | TEMPERATURE: 98 F | SYSTOLIC BLOOD PRESSURE: 136 MMHG | DIASTOLIC BLOOD PRESSURE: 69 MMHG

## 2017-04-13 DIAGNOSIS — L97.522 SKIN ULCER OF TOE OF LEFT FOOT WITH FAT LAYER EXPOSED: ICD-10-CM

## 2017-04-13 DIAGNOSIS — L97.512 ULCER OF RIGHT FOOT WITH FAT LAYER EXPOSED: ICD-10-CM

## 2017-04-13 DIAGNOSIS — I87.2 VENOUS INSUFFICIENCY OF BOTH LOWER EXTREMITIES: ICD-10-CM

## 2017-04-13 DIAGNOSIS — R60.0 BILATERAL LEG EDEMA: ICD-10-CM

## 2017-04-13 DIAGNOSIS — L30.9 DERMATITIS: ICD-10-CM

## 2017-04-13 DIAGNOSIS — L97.511 ULCER OF TOE, RIGHT, LIMITED TO BREAKDOWN OF SKIN: ICD-10-CM

## 2017-04-13 DIAGNOSIS — E11.8 TYPE 2 DIABETES MELLITUS WITH COMPLICATION, WITHOUT LONG-TERM CURRENT USE OF INSULIN: Primary | ICD-10-CM

## 2017-04-13 PROCEDURE — 99212 OFFICE O/P EST SF 10 MIN: CPT | Mod: S$PBB,,, | Performed by: NURSE PRACTITIONER

## 2017-04-13 PROCEDURE — 99215 OFFICE O/P EST HI 40 MIN: CPT | Mod: PBBFAC | Performed by: NURSE PRACTITIONER

## 2017-04-13 PROCEDURE — 99999 PR PBB SHADOW E&M-EST. PATIENT-LVL V: CPT | Mod: PBBFAC,,, | Performed by: NURSE PRACTITIONER

## 2017-04-13 NOTE — MR AVS SNAPSHOT
[facility]  [address]  [facility] [address]  Phone: [phone]                  Jose F Hall   2017 3:00 PM   Office Visit    Description:  Male : 1950   Provider:  Nikkie Morales NP   Department:  [facility]           Reason for Visit     Wound Check           Diagnoses this Visit        Comments    Type 2 diabetes mellitus with complication, without long-term current use of insulin    -  Primary     Skin ulcer of toe of left foot with fat layer exposed         Ulcer of right foot with fat layer exposed         Ulcer of toe, right, limited to breakdown of skin         Venous insufficiency of both lower extremities         Dermatitis         Bilateral leg edema                To Do List           Future Appointments        Provider Department Dept Phone    2017 8:40 AM [facility] [phone]      Goals (5 Years of Data)     None      Follow-Up and Disposition     Return in about 2 weeks (around 2017) for Wound evaluation.      [facility] On Call     [facility] On Call Nurse Care Line -  Assistance  Unless otherwise directed by your provider, please contact [facility] On-Call, our nurse care line that is available for  assistance.     Registered nurses in the [facility] On Call Center provide: appointment scheduling, clinical advisement, health education, and other advisory services.  Call: [phone] (toll free)               Medications           Message regarding Medications     Verify the changes and/or additions to your medication regime listed below are the same as discussed with your clinician today.  If any of these changes or additions are incorrect, please notify your healthcare provider.             Verify that the below list of medications is an accurate representation of the medications you are currently taking.  If none reported, the list may be blank. If incorrect, please contact your healthcare  provider. Carry this list with you in case of emergency.           Current Medications     acetaminophen (TYLENOL) 325 MG tablet Take 325 mg by mouth 2 (two) times daily.    amitriptyline (ELAVIL) 75 MG tablet Take 100 mg by mouth every evening.     aspirin 81 MG Chew Take 81 mg by mouth once daily.    atenolol (TENORMIN) 100 MG tablet Take 100 mg by mouth once daily.    atorvastatin (LIPITOR) 80 MG tablet Take 40 mg by mouth every evening.     collagenase (SANTYL) ointment Apply topically once daily. Apply to wound daily as directed.    cyanocobalamin (VITAMIN B-12) 1000 MCG tablet Take 100 mcg by mouth 2 (two) times daily.     docusate sodium (COLACE) 50 MG capsule Take by mouth 2 (two) times daily.    fish oil-omega-3 fatty acids 300-1,000 mg capsule Take 2 g by mouth 3 (three) times daily.     gemfibrozil (LOPID) 600 MG tablet Take 600 mg by mouth 2 (two) times daily before meals.    insulin aspart (NOVOLOG) 100 unit/mL injection Inject 40 Units into the skin 3 (three) times daily before meals.     insulin glargine (LANTUS) 100 unit/mL injection Inject 40 Units into the skin every morning.     insulin glargine (LANTUS) 100 unit/mL injection Inject 60 Units into the skin every evening.    isosorbide dinitrate (ISORDIL) 40 MG Tab Take 20 mg by mouth once daily.     meloxicam (MOBIC) 7.5 MG tablet Take 7.5 mg by mouth 2 (two) times daily.    metformin (GLUCOPHAGE) 1000 MG tablet Take 1,000 mg by mouth 2 (two) times daily with meals.    methocarbamol (ROBAXIN) 750 MG Tab Take 750 mg by mouth 2 (two) times daily.    nitroGLYCERIN (NITROSTAT) 0.4 MG SL tablet Place 0.4 mg under the tongue every 5 (five) minutes as needed.    omeprazole (PRILOSEC) 20 MG capsule Take 20 mg by mouth 2 (two) times daily.     pregabalin (LYRICA) 150 MG capsule Take 150 mg by mouth 2 (two) times daily.    valsartan (DIOVAN) 160 MG tablet Take 160 mg by mouth once daily.    warfarin (COUMADIN) 5 MG tablet Take 10 mg by mouth every evening.  "          Clinical Reference Information           Your Vitals Were     Height Weight BMI          6' 2" (1.88 m) 155.3 kg (342 lb 4.8 oz) 43.95 kg/m2        Allergies as of 4/13/2017     No Known Allergies      Immunizations Administered on Date of Encounter - 4/13/2017     None      Instructions    Eucerin cream to lower legs twice daily.  Medihoney gel to foot and toe wounds daily.  Cotton in between toes.  Cover wounds with gauze and secure with roll gauze.  Kerlix and two 4" ace wraps bilateral lower legs.       Language Assistance Services     ATTENTION: Language assistance services are available, free of charge. Please call [phone].      ATENCIÓN: Si habla pricilla, tiene a fields disposición servicios gratuitos de asistencia lingüística. Llame al [phone].     SENDY Ý: N?u b?n nói Ti?ng Vi?t, có các d?ch v? h? tr? ngôn ng? mi?n phí dành cho b?n. G?i s? [phone].         [facility] complies with applicable Federal civil rights laws and does not discriminate on the basis of race, color, national origin, age, disability, or sex.        "

## 2017-04-13 NOTE — PROGRESS NOTES
Subjective:       Patient ID: Jose F Hall is a 67 y.o. male.    Chief Complaint: Wound Check    Wound Check        This patient is seen today for reevaluation of wounds to both lower legs. They have been present since May 2015.  He was seen by a podiatrist at [place] and has used santyl, bacitracin and aquacel but it was not healing.  He is currently using wound medihoney gel on the ulcers daily.  He was given new footwear by the [facility] for the right leg.  Modifications were made last week to the shoes relieving pressure points.  All of the wounds are smaller in size and the right third toe ulcer is healed.  He is afebrile.  He denies increased redness, swelling or purulent drainage.  His pain level is 1/10.  Review of Systems   Constitutional: Negative for chills, diaphoresis and fever.   HENT: Negative for hearing loss, postnasal drip, rhinorrhea, sinus pressure, sneezing, sore throat, tinnitus and trouble swallowing.    Eyes: Negative for visual disturbance.   Respiratory: Positive for apnea (on CPAP) and shortness of breath. Negative for cough and wheezing.    Cardiovascular: Positive for leg swelling. Negative for chest pain and palpitations.   Gastrointestinal: Negative for constipation, diarrhea, nausea and vomiting.   Endocrine: Negative for cold intolerance, heat intolerance, polydipsia and polyuria.   Genitourinary: Negative for difficulty urinating, dysuria, frequency and hematuria.   Musculoskeletal: Negative for arthralgias, back pain and joint swelling.   Skin: Positive for wound. Negative for color change.   Neurological: Negative for dizziness, weakness, light-headedness and headaches.   Hematological: Does not bruise/bleed easily.   Psychiatric/Behavioral: Negative for confusion, decreased concentration, dysphoric mood and sleep disturbance. The patient is not nervous/anxious.        Objective:      Physical Exam   Constitutional: He is oriented to person, place, and time.  "He appears well-developed and well-nourished. No distress.   HENT:   Head: Normocephalic and atraumatic.   Neck: Normal range of motion.   Pulmonary/Chest: Effort normal.   Musculoskeletal: Normal range of motion. He exhibits no edema or tenderness.        Feet:    Neurological: He is alert and oriented to person, place, and time.   Skin: Skin is warm and dry. No rash noted. He is not diaphoretic. No erythema. No pallor.   Psychiatric: He has a normal mood and affect. His behavior is normal. Judgment and thought content normal.   Nursing note and vitals reviewed.        Assessment:       1. Type 2 diabetes mellitus with complication, without long-term current use of insulin    2. Skin ulcer of toe of left foot with fat layer exposed    3. Ulcer of right foot with fat layer exposed    4. Ulcer of toe, right, limited to breakdown of skin    5. Venous insufficiency of both lower extremities    6. Dermatitis    7. Bilateral leg edema        Plan:           Eucerin cream to lower legs twice daily.  Medihoney gel to foot and toe wounds daily.  Cotton in between toes.  Cover wounds with gauze and secure with roll gauze.  Kerlix and two 4" ace wraps bilateral lower legs.  Return to clinic in 2 weeks.    Right dorsal second and third toe wounds      Left great toe      Right plantar foot      Right lateral heel                                                                        "

## 2017-04-27 ENCOUNTER — OFFICE VISIT (OUTPATIENT)
Dept: WOUND CARE | Facility: CLINIC | Age: 67
End: 2017-04-27
Payer: OTHER GOVERNMENT

## 2017-04-27 VITALS
WEIGHT: 315 LBS | TEMPERATURE: 97 F | DIASTOLIC BLOOD PRESSURE: 81 MMHG | HEART RATE: 74 BPM | HEIGHT: 74 IN | BODY MASS INDEX: 40.43 KG/M2 | SYSTOLIC BLOOD PRESSURE: 166 MMHG

## 2017-04-27 DIAGNOSIS — L30.9 DERMATITIS: ICD-10-CM

## 2017-04-27 DIAGNOSIS — R60.0 BILATERAL LEG EDEMA: ICD-10-CM

## 2017-04-27 DIAGNOSIS — L97.512 ULCER OF RIGHT FOOT WITH FAT LAYER EXPOSED: ICD-10-CM

## 2017-04-27 DIAGNOSIS — L97.511 ULCER OF TOE, RIGHT, LIMITED TO BREAKDOWN OF SKIN: ICD-10-CM

## 2017-04-27 DIAGNOSIS — E11.8 TYPE 2 DIABETES MELLITUS WITH COMPLICATION, WITHOUT LONG-TERM CURRENT USE OF INSULIN: Primary | ICD-10-CM

## 2017-04-27 PROCEDURE — 99212 OFFICE O/P EST SF 10 MIN: CPT | Mod: S$PBB,,, | Performed by: NURSE PRACTITIONER

## 2017-04-27 PROCEDURE — 99999 PR PBB SHADOW E&M-EST. PATIENT-LVL V: CPT | Mod: PBBFAC,,, | Performed by: NURSE PRACTITIONER

## 2017-04-27 PROCEDURE — 99215 OFFICE O/P EST HI 40 MIN: CPT | Mod: PBBFAC | Performed by: NURSE PRACTITIONER

## 2017-04-27 RX ORDER — ERGOCALCIFEROL 1.25 MG/1
50000 CAPSULE ORAL
COMMUNITY

## 2017-04-27 RX ORDER — ROSUVASTATIN CALCIUM 40 MG/1
10 TABLET, COATED ORAL NIGHTLY
COMMUNITY

## 2017-04-27 NOTE — PATIENT INSTRUCTIONS
Eucerin cream to lower legs twice daily.  Medihoney gel to foot and toe wounds daily.  Cover foot wound with mepore island dressing.  Cover toe wound with gauze and coban.  Compression hose bilateral lower legs.

## 2017-04-27 NOTE — PROGRESS NOTES
Subjective:       Patient ID: Jose F Hall is a 67 y.o. male.    Chief Complaint: Wound Check    Wound Check        This patient is seen today for reevaluation of wounds to both lower legs. They have been present since May 2015.  He was seen by a podiatrist at [place] and has used santyl, bacitracin and aquacel but it was not healing.  He is currently using wound medihoney gel on the ulcers daily.  The right plantar foot and second toe wounds are healed.  All of the other wounds are smaller in size.  He is afebrile.  He denies increased redness, swelling or purulent drainage.  He has no pain.  Review of Systems   Constitutional: Negative for chills, diaphoresis and fever.   HENT: Negative for hearing loss, postnasal drip, rhinorrhea, sinus pressure, sneezing, sore throat, tinnitus and trouble swallowing.    Eyes: Negative for visual disturbance.   Respiratory: Positive for apnea (on CPAP) and shortness of breath. Negative for cough and wheezing.    Cardiovascular: Positive for leg swelling. Negative for chest pain and palpitations.   Gastrointestinal: Negative for constipation, diarrhea, nausea and vomiting.   Endocrine: Negative for cold intolerance, heat intolerance, polydipsia and polyuria.   Genitourinary: Negative for difficulty urinating, dysuria, frequency and hematuria.   Musculoskeletal: Negative for arthralgias, back pain and joint swelling.   Skin: Positive for wound. Negative for color change.   Neurological: Negative for dizziness, weakness, light-headedness and headaches.   Hematological: Does not bruise/bleed easily.   Psychiatric/Behavioral: Negative for confusion, decreased concentration, dysphoric mood and sleep disturbance. The patient is not nervous/anxious.        Objective:      Physical Exam   Constitutional: He is oriented to person, place, and time. He appears well-developed and well-nourished. No distress.   HENT:   Head: Normocephalic and atraumatic.   Neck: Normal range  of motion.   Pulmonary/Chest: Effort normal.   Musculoskeletal: Normal range of motion. He exhibits no edema or tenderness.        Feet:    Neurological: He is alert and oriented to person, place, and time.   Skin: Skin is warm and dry. No rash noted. He is not diaphoretic. No erythema. No pallor.   Psychiatric: He has a normal mood and affect. His behavior is normal. Judgment and thought content normal.   Nursing note and vitals reviewed.        Assessment:       1. Type 2 diabetes mellitus with complication, without long-term current use of insulin    2. Ulcer of right foot with fat layer exposed    3. Ulcer of toe, right, limited to breakdown of skin    4. Dermatitis    5. Bilateral leg edema        Plan:           Eucerin cream to lower legs twice daily.  Medihoney gel to foot and toe wounds daily.  Cover foot wound with mepore island dressing.  Cover toe wound with gauze and coban.  Compression hose bilateral lower legs.  Return to clinic in 2 weeks.    Right dorsal second and third toe wounds healed      Left great toe      Right plantar foot      Right lateral heel

## 2017-04-27 NOTE — MR AVS SNAPSHOT
[facility]  [address]  [facility] [address]  Phone: [phone]                  Jose F Hall   2017 10:40 AM   Office Visit    Description:  Male : 1950   Provider:  Nikkie Morales NP   Department:  [facility]           Reason for Visit     Wound Check           Diagnoses this Visit        Comments    Type 2 diabetes mellitus with complication, without long-term current use of insulin    -  Primary     Ulcer of right foot with fat layer exposed         Ulcer of toe, right, limited to breakdown of skin         Dermatitis         Bilateral leg edema                To Do List           Goals (5 Years of Data)     None      Follow-Up and Disposition     Return in about 2 weeks (around 2017) for Wound evaluation.      [facility] On Call     [facility] On Call Nurse Care Line -  Assistance  Unless otherwise directed by your provider, please contact [facility] On-Call, our nurse care line that is available for  assistance.     Registered nurses in the [facility] On Call Center provide: appointment scheduling, clinical advisement, health education, and other advisory services.  Call: [phone] (toll free)               Medications           Message regarding Medications     Verify the changes and/or additions to your medication regime listed below are the same as discussed with your clinician today.  If any of these changes or additions are incorrect, please notify your healthcare provider.        STOP taking these medications     atorvastatin (LIPITOR) 80 MG tablet Take 40 mg by mouth every evening.            Verify that the below list of medications is an accurate representation of the medications you are currently taking.  If none reported, the list may be blank. If incorrect, please contact your healthcare provider. Carry this list with you in case of emergency.           Current Medications     acetaminophen (TYLENOL) 325 MG tablet Take 325 mg by  mouth 2 (two) times daily.    amitriptyline (ELAVIL) 75 MG tablet Take 100 mg by mouth every evening.     aspirin 81 MG Chew Take 81 mg by mouth once daily.    atenolol (TENORMIN) 100 MG tablet Take 100 mg by mouth once daily.    collagenase (SANTYL) ointment Apply topically once daily. Apply to wound daily as directed.    cyanocobalamin (VITAMIN B-12) 1000 MCG tablet Take 100 mcg by mouth 2 (two) times daily.     docusate sodium (COLACE) 50 MG capsule Take by mouth 2 (two) times daily.    ergocalciferol (ERGOCALCIFEROL) 50,000 unit Cap Take 50,000 Units by mouth every 7 days.    fish oil-omega-3 fatty acids 300-1,000 mg capsule Take 2 g by mouth 3 (three) times daily.     gemfibrozil (LOPID) 600 MG tablet Take 600 mg by mouth 2 (two) times daily before meals.    insulin aspart (NOVOLOG) 100 unit/mL injection Inject 40 Units into the skin 3 (three) times daily before meals.     insulin glargine (LANTUS) 100 unit/mL injection Inject 40 Units into the skin every morning.     insulin glargine (LANTUS) 100 unit/mL injection Inject 60 Units into the skin every evening.    isosorbide dinitrate (ISORDIL) 40 MG Tab Take 20 mg by mouth once daily.     meloxicam (MOBIC) 7.5 MG tablet Take 7.5 mg by mouth 2 (two) times daily.    metformin (GLUCOPHAGE) 1000 MG tablet Take 1,000 mg by mouth 2 (two) times daily with meals.    methocarbamol (ROBAXIN) 750 MG Tab Take 750 mg by mouth 2 (two) times daily.    nitroGLYCERIN (NITROSTAT) 0.4 MG SL tablet Place 0.4 mg under the tongue every 5 (five) minutes as needed.    omeprazole (PRILOSEC) 20 MG capsule Take 20 mg by mouth 2 (two) times daily.     pregabalin (LYRICA) 150 MG capsule Take 150 mg by mouth 2 (two) times daily.    rosuvastatin (CRESTOR) 40 MG Tab Take 10 mg by mouth every evening.    valsartan (DIOVAN) 160 MG tablet Take 160 mg by mouth once daily.    warfarin (COUMADIN) 5 MG tablet Take 10 mg by mouth every evening.           Clinical Reference Information           Your  "Vitals Were     BP Pulse Temp Height Weight BMI    166/81 (BP Location: Right arm, Patient Position: Sitting, BP Method: Automatic) 74 97.3 °F (36.3 °C) (Oral) 6' 2" (1.88 m) 155.8 kg (343 lb 8 oz) 44.1 kg/m2      Blood Pressure          Most Recent Value    BP  (!)  166/81      Allergies as of 4/27/2017     Gabapentin      Immunizations Administered on Date of Encounter - 4/27/2017     None      Instructions    Eucerin cream to lower legs twice daily.  Medihoney gel to foot and toe wounds daily.  Cover foot wound with mepore island dressing.  Cover toe wound with gauze and coban.  Compression hose bilateral lower legs.         Language Assistance Services     ATTENTION: Language assistance services are available, free of charge. Please call [phone].      ATENCIÓN: Si jaylynla pricilla, tiene a fields disposición servicios gratuitos de asistencia lingüística. Llame al [phone].     CHÚ Ý: N?u b?n nói Ti?ng Vi?t, có các d?ch v? h? tr? ngôn ng? mi?n phí dành cho b?n. G?i s? [phone].         [facility] complies with applicable Federal civil rights laws and does not discriminate on the basis of race, color, national origin, age, disability, or sex.        "

## 2017-05-16 ENCOUNTER — OFFICE VISIT (OUTPATIENT)
Dept: WOUND CARE | Facility: CLINIC | Age: 67
End: 2017-05-16
Payer: COMMERCIAL

## 2017-05-16 VITALS
WEIGHT: 315 LBS | HEIGHT: 72 IN | TEMPERATURE: 98 F | BODY MASS INDEX: 42.66 KG/M2 | SYSTOLIC BLOOD PRESSURE: 125 MMHG | DIASTOLIC BLOOD PRESSURE: 63 MMHG | HEART RATE: 70 BPM

## 2017-05-16 DIAGNOSIS — I87.2 VENOUS INSUFFICIENCY OF BOTH LOWER EXTREMITIES: Primary | ICD-10-CM

## 2017-05-16 PROBLEM — L97.512 ULCER OF RIGHT FOOT WITH FAT LAYER EXPOSED: Status: RESOLVED | Noted: 2017-03-24 | Resolved: 2017-05-16

## 2017-05-16 PROCEDURE — 99999 PR PBB SHADOW E&M-EST. PATIENT-LVL V: CPT | Mod: PBBFAC,,, | Performed by: NURSE PRACTITIONER

## 2017-05-16 PROCEDURE — 99211 OFF/OP EST MAY X REQ PHY/QHP: CPT | Mod: S$PBB,,, | Performed by: NURSE PRACTITIONER

## 2017-05-16 PROCEDURE — 99215 OFFICE O/P EST HI 40 MIN: CPT | Mod: PBBFAC | Performed by: NURSE PRACTITIONER

## 2017-05-16 NOTE — MR AVS SNAPSHOT
[facility]  [address]  [facility] [address]  Phone: [phone]                  Jose F Hall   2017 12:40 PM   Office Visit    Description:  Male : 1950   Provider:  Nikkie Morales NP   Department:  [facility]           Reason for Visit     Wound Check           Diagnoses this Visit        Comments    Venous insufficiency of both lower extremities    -  Primary            To Do List           Goals (5 Years of Data)     None      Follow-Up and Disposition     Return if symptoms worsen or fail to improve.      [facility] On Call     [facility] On Call Nurse Care Line -  Assistance  Unless otherwise directed by your provider, please contact [facility], our nurse care line that is available for  assistance.     Registered nurses in the [facility] On Call Center provide: appointment scheduling, clinical advisement, health education, and other advisory services.  Call: [phone] (toll free)               Medications           Message regarding Medications     Verify the changes and/or additions to your medication regime listed below are the same as discussed with your clinician today.  If any of these changes or additions are incorrect, please notify your healthcare provider.             Verify that the below list of medications is an accurate representation of the medications you are currently taking.  If none reported, the list may be blank. If incorrect, please contact your healthcare provider. Carry this list with you in case of emergency.           Current Medications     acetaminophen (TYLENOL) 325 MG tablet Take 325 mg by mouth 2 (two) times daily.    amitriptyline (ELAVIL) 75 MG tablet Take 100 mg by mouth every evening.     aspirin 81 MG Chew Take 81 mg by mouth once daily.    atenolol (TENORMIN) 100 MG tablet Take 100 mg by mouth once daily.    collagenase (SANTYL) ointment Apply topically once daily. Apply to wound daily as directed.     cyanocobalamin (VITAMIN B-12) 1000 MCG tablet Take 100 mcg by mouth 2 (two) times daily.     docusate sodium (COLACE) 50 MG capsule Take by mouth 2 (two) times daily.    ergocalciferol (ERGOCALCIFEROL) 50,000 unit Cap Take 50,000 Units by mouth every 7 days.    fish oil-omega-3 fatty acids 300-1,000 mg capsule Take 2 g by mouth 3 (three) times daily.     gemfibrozil (LOPID) 600 MG tablet Take 600 mg by mouth 2 (two) times daily before meals.    insulin aspart (NOVOLOG) 100 unit/mL injection Inject 40 Units into the skin 3 (three) times daily before meals.     insulin glargine (LANTUS) 100 unit/mL injection Inject 40 Units into the skin every morning.     insulin glargine (LANTUS) 100 unit/mL injection Inject 60 Units into the skin every evening.    isosorbide dinitrate (ISORDIL) 40 MG Tab Take 20 mg by mouth once daily.     meloxicam (MOBIC) 7.5 MG tablet Take 7.5 mg by mouth 2 (two) times daily.    metformin (GLUCOPHAGE) 1000 MG tablet Take 1,000 mg by mouth 2 (two) times daily with meals.    methocarbamol (ROBAXIN) 750 MG Tab Take 750 mg by mouth 2 (two) times daily.    nitroGLYCERIN (NITROSTAT) 0.4 MG SL tablet Place 0.4 mg under the tongue every 5 (five) minutes as needed.    omeprazole (PRILOSEC) 20 MG capsule Take 20 mg by mouth 2 (two) times daily.     pregabalin (LYRICA) 150 MG capsule Take 150 mg by mouth 2 (two) times daily.    rosuvastatin (CRESTOR) 40 MG Tab Take 10 mg by mouth every evening.    valsartan (DIOVAN) 160 MG tablet Take 160 mg by mouth once daily.    warfarin (COUMADIN) 5 MG tablet Take 10 mg by mouth every evening.           Clinical Reference Information           Your Vitals Were     BP Pulse Temp Height Weight BMI    125/63 70 98 °F (36.7 °C) (Oral) 6' (1.829 m) 155.4 kg (342 lb 9.6 oz) 46.46 kg/m2      Blood Pressure          Most Recent Value    BP  125/63      Allergies as of 5/16/2017     Gabapentin      Immunizations Administered on Date of Encounter - 5/16/2017     None       Instructions    Apply compression hose first thing in the morning and remove at bedtime.       Language Assistance Services     ATTENTION: Language assistance services are available, free of charge. Please call [phone].      ATENCIÓN: Si habla pricilla, tiene a fields disposición servicios gratuitos de asistencia lingüística. Llame al [phone].     CHÚ Ý: N?u b?n nói Ti?ng Vi?t, có các d?ch v? h? tr? ngôn ng? mi?n phí dành cho b?n. G?i s? [phone].         [facility] complies with applicable Federal civil rights laws and does not discriminate on the basis of race, color, national origin, age, disability, or sex.

## 2017-05-16 NOTE — PROGRESS NOTES
Subjective:       Patient ID: Jose F Hall is a 67 y.o. male.    Chief Complaint: Wound Check    Wound Check        This patient is seen today for reevaluation of wounds to both lower legs. They have been present since May 2015.  He was seen by a podiatrist at [facility] and has used santyl, bacitracin and aquacel but it was not healing.  He is currently using medihoney gel on the ulcers daily and they are healed.   He is afebrile.  He denies increased redness, swelling or purulent drainage.  He has no pain.  Review of Systems   Constitutional: Negative for chills, diaphoresis and fever.   HENT: Negative for hearing loss, postnasal drip, rhinorrhea, sinus pressure, sneezing, sore throat, tinnitus and trouble swallowing.    Eyes: Negative for visual disturbance.   Respiratory: Positive for apnea (on CPAP) and shortness of breath. Negative for cough and wheezing.    Cardiovascular: Positive for leg swelling. Negative for chest pain and palpitations.   Gastrointestinal: Negative for constipation, diarrhea, nausea and vomiting.   Endocrine: Negative for cold intolerance, heat intolerance, polydipsia and polyuria.   Genitourinary: Negative for difficulty urinating, dysuria, frequency and hematuria.   Musculoskeletal: Negative for arthralgias, back pain and joint swelling.   Skin: Positive for wound. Negative for color change.   Neurological: Negative for dizziness, weakness, light-headedness and headaches.   Hematological: Does not bruise/bleed easily.   Psychiatric/Behavioral: Negative for confusion, decreased concentration, dysphoric mood and sleep disturbance. The patient is not nervous/anxious.        Objective:      Physical Exam   Constitutional: He is oriented to person, place, and time. He appears well-developed and well-nourished. No distress.   HENT:   Head: Normocephalic and atraumatic.   Neck: Normal range of motion.   Pulmonary/Chest: Effort normal.   Musculoskeletal: Normal range of motion.  He exhibits no edema or tenderness.        Feet:    Neurological: He is alert and oriented to person, place, and time.   Skin: Skin is warm and dry. No rash noted. He is not diaphoretic. No erythema. No pallor.   Psychiatric: He has a normal mood and affect. His behavior is normal. Judgment and thought content normal.   Nursing note and vitals reviewed.        Assessment:       1. Venous insufficiency of both lower extremities        Plan:           Eucerin cream to lower legs twice daily.  Compression hose bilateral lower legs.  Return to clinic as needed.    Left great toe      Right lateral heel

## 2017-06-20 ENCOUNTER — TELEPHONE (OUTPATIENT)
Dept: WOUND CARE | Facility: CLINIC | Age: 67
End: 2017-06-20

## 2017-06-21 ENCOUNTER — TELEPHONE (OUTPATIENT)
Dept: WOUND CARE | Facility: CLINIC | Age: 67
End: 2017-06-21

## 2017-06-21 NOTE — TELEPHONE ENCOUNTER
----- Message from Kristina Fung sent at 6/20/2017  4:56 PM CDT -----  Contact: [facility]  Caller states to get the services covered they need to you to fill out a secondary request form and have it retro ed until January when the services stop being paid.He states to submit the medical documentation regarding the services that were provided call [facility] [email or web address] @ [phone].Thank you.